# Patient Record
Sex: MALE | Race: WHITE | NOT HISPANIC OR LATINO | Employment: OTHER | URBAN - NONMETROPOLITAN AREA
[De-identification: names, ages, dates, MRNs, and addresses within clinical notes are randomized per-mention and may not be internally consistent; named-entity substitution may affect disease eponyms.]

---

## 2022-02-17 ENCOUNTER — OFFICE VISIT (OUTPATIENT)
Dept: CARDIOLOGY | Facility: CLINIC | Age: 37
End: 2022-02-17

## 2022-02-17 VITALS
TEMPERATURE: 97 F | WEIGHT: 186 LBS | DIASTOLIC BLOOD PRESSURE: 72 MMHG | SYSTOLIC BLOOD PRESSURE: 140 MMHG | HEART RATE: 82 BPM | HEIGHT: 72 IN | BODY MASS INDEX: 25.19 KG/M2

## 2022-02-17 DIAGNOSIS — R07.89 CHEST HEAVINESS: ICD-10-CM

## 2022-02-17 DIAGNOSIS — S09.90XA RECENT HEAD TRAUMA, INITIAL ENCOUNTER: ICD-10-CM

## 2022-02-17 DIAGNOSIS — R00.2 PALPITATIONS: ICD-10-CM

## 2022-02-17 DIAGNOSIS — R94.31 ABNORMAL EKG: ICD-10-CM

## 2022-02-17 DIAGNOSIS — R42 DIZZINESS: Primary | ICD-10-CM

## 2022-02-17 DIAGNOSIS — R61 DIAPHORESIS: ICD-10-CM

## 2022-02-17 DIAGNOSIS — R00.1 BRADYCARDIA, SINUS: ICD-10-CM

## 2022-02-17 DIAGNOSIS — F17.200 SMOKING: ICD-10-CM

## 2022-02-17 PROCEDURE — 93000 ELECTROCARDIOGRAM COMPLETE: CPT | Performed by: INTERNAL MEDICINE

## 2022-02-17 PROCEDURE — 99244 OFF/OP CNSLTJ NEW/EST MOD 40: CPT | Performed by: INTERNAL MEDICINE

## 2022-02-17 RX ORDER — MECLIZINE HYDROCHLORIDE 25 MG/1
25 TABLET ORAL 3 TIMES DAILY PRN
COMMUNITY
End: 2022-03-21

## 2022-02-17 RX ORDER — NICOTINE 10 MG
1 CARTRIDGE (EA) INHALATION AS NEEDED
Qty: 168 EACH | Refills: 6 | Status: SHIPPED | OUTPATIENT
Start: 2022-02-17 | End: 2022-03-21
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
## 2022-02-17 NOTE — PROGRESS NOTES
Yaya Bello  reports that he has been smoking cigarettes. He has a 30.00 pack-year smoking history. He has never used smokeless tobacco.. I have educated him on the risk of diseases from using tobacco products such as cancer, COPD and heart disease.     I advised him to quit and he is willing to quit. We have discussed the following method/s for tobacco cessation:  Education Material Counseling Prescription Medicaiton.  Together we have set a quit date for 1 week from today.  He will follow up with me in 3 months or sooner to check on his progress.    I spent 3  minutes counseling the patient.

## 2022-02-25 ENCOUNTER — TELEPHONE (OUTPATIENT)
Dept: CARDIOLOGY | Facility: CLINIC | Age: 37
End: 2022-02-25

## 2022-03-07 ENCOUNTER — TELEPHONE (OUTPATIENT)
Dept: NEUROLOGY | Facility: OTHER | Age: 37
End: 2022-03-07

## 2022-03-07 NOTE — TELEPHONE ENCOUNTER
Patients wife states that peggy left eye is now closed and he can't really open it up. I have placed him on high priority wait list in case we have any openings.

## 2022-03-15 ENCOUNTER — TELEPHONE (OUTPATIENT)
Dept: CARDIOLOGY | Facility: CLINIC | Age: 37
End: 2022-03-15

## 2022-03-15 DIAGNOSIS — R42 DIZZINESS: Primary | ICD-10-CM

## 2022-03-15 DIAGNOSIS — R00.1 BRADYCARDIA, SINUS: ICD-10-CM

## 2022-03-15 DIAGNOSIS — R07.89 CHEST HEAVINESS: ICD-10-CM

## 2022-03-15 DIAGNOSIS — R94.31 ABNORMAL EKG: ICD-10-CM

## 2022-03-15 NOTE — TELEPHONE ENCOUNTER
[facility] team contacted me, patient's nuclear stress test was denied by insurance.  They will cover treadmill stress if you want to order?

## 2022-03-21 ENCOUNTER — OFFICE VISIT (OUTPATIENT)
Dept: NEUROLOGY | Facility: CLINIC | Age: 37
End: 2022-03-21

## 2022-03-21 ENCOUNTER — LAB (OUTPATIENT)
Dept: LAB | Facility: HOSPITAL | Age: 37
End: 2022-03-21

## 2022-03-21 VITALS
HEIGHT: 72 IN | DIASTOLIC BLOOD PRESSURE: 86 MMHG | HEART RATE: 66 BPM | WEIGHT: 197.4 LBS | SYSTOLIC BLOOD PRESSURE: 142 MMHG | BODY MASS INDEX: 26.74 KG/M2 | TEMPERATURE: 97.3 F | OXYGEN SATURATION: 99 %

## 2022-03-21 DIAGNOSIS — R42 DIZZINESS: Primary | ICD-10-CM

## 2022-03-21 DIAGNOSIS — R42 DIZZINESS: ICD-10-CM

## 2022-03-21 PROBLEM — D18.00 HEMANGIOMA: Status: ACTIVE | Noted: 2022-03-21

## 2022-03-21 PROBLEM — F12.10 CANNABIS ABUSE: Status: ACTIVE | Noted: 2022-03-21

## 2022-03-21 PROBLEM — Z72.0 TOBACCO ABUSE: Status: ACTIVE | Noted: 2022-02-17

## 2022-03-21 PROBLEM — R11.0 NAUSEA: Status: ACTIVE | Noted: 2022-03-21

## 2022-03-21 PROBLEM — R10.11 RIGHT UPPER QUADRANT PAIN: Status: ACTIVE | Noted: 2022-03-21

## 2022-03-21 LAB
BASOPHILS # BLD AUTO: 0.04 10*3/MM3 (ref 0–0.2)
BASOPHILS NFR BLD AUTO: 0.5 % (ref 0–1.5)
CHROMATIN AB SERPL-ACNC: <10 IU/ML (ref 0–14)
CK SERPL-CCNC: 155 U/L (ref 20–200)
DEPRECATED RDW RBC AUTO: 38.7 FL (ref 37–54)
EOSINOPHIL # BLD AUTO: 0.18 10*3/MM3 (ref 0–0.4)
EOSINOPHIL NFR BLD AUTO: 2.3 % (ref 0.3–6.2)
ERYTHROCYTE [DISTWIDTH] IN BLOOD BY AUTOMATED COUNT: 12.1 % (ref 12.3–15.4)
ERYTHROCYTE [SEDIMENTATION RATE] IN BLOOD: 5 MM/HR (ref 0–15)
HCT VFR BLD AUTO: 49.9 % (ref 37.5–51)
HGB BLD-MCNC: 17.3 G/DL (ref 13–17.7)
IMM GRANULOCYTES # BLD AUTO: 0.02 10*3/MM3 (ref 0–0.05)
IMM GRANULOCYTES NFR BLD AUTO: 0.3 % (ref 0–0.5)
LYMPHOCYTES # BLD AUTO: 2.25 10*3/MM3 (ref 0.7–3.1)
LYMPHOCYTES NFR BLD AUTO: 29.4 % (ref 19.6–45.3)
MCH RBC QN AUTO: 30.7 PG (ref 26.6–33)
MCHC RBC AUTO-ENTMCNC: 34.7 G/DL (ref 31.5–35.7)
MCV RBC AUTO: 88.5 FL (ref 79–97)
MONOCYTES # BLD AUTO: 0.49 10*3/MM3 (ref 0.1–0.9)
MONOCYTES NFR BLD AUTO: 6.4 % (ref 5–12)
NEUTROPHILS NFR BLD AUTO: 4.68 10*3/MM3 (ref 1.7–7)
NEUTROPHILS NFR BLD AUTO: 61.1 % (ref 42.7–76)
NRBC BLD AUTO-RTO: 0 /100 WBC (ref 0–0.2)
PLATELET # BLD AUTO: 272 10*3/MM3 (ref 140–450)
PMV BLD AUTO: 10.3 FL (ref 6–12)
RBC # BLD AUTO: 5.64 10*6/MM3 (ref 4.14–5.8)
T4 SERPL-MCNC: 8.04 MCG/DL (ref 4.5–11.7)
VIT B12 BLD-MCNC: 442 PG/ML (ref 211–946)
WBC NRBC COR # BLD: 7.66 10*3/MM3 (ref 3.4–10.8)

## 2022-03-21 PROCEDURE — 84436 ASSAY OF TOTAL THYROXINE: CPT

## 2022-03-21 PROCEDURE — 82550 ASSAY OF CK (CPK): CPT

## 2022-03-21 PROCEDURE — 86431 RHEUMATOID FACTOR QUANT: CPT

## 2022-03-21 PROCEDURE — 85652 RBC SED RATE AUTOMATED: CPT

## 2022-03-21 PROCEDURE — 36415 COLL VENOUS BLD VENIPUNCTURE: CPT

## 2022-03-21 PROCEDURE — 99204 OFFICE O/P NEW MOD 45 MIN: CPT | Performed by: NURSE PRACTITIONER

## 2022-03-21 PROCEDURE — 82607 VITAMIN B-12: CPT

## 2022-03-21 PROCEDURE — 86038 ANTINUCLEAR ANTIBODIES: CPT

## 2022-03-21 PROCEDURE — 85025 COMPLETE CBC W/AUTO DIFF WBC: CPT
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
## 2022-03-22 LAB
ANA TITR SER IF: NEGATIVE {TITER}
LABORATORY COMMENT REPORT: NORMAL

## 2022-03-23 ENCOUNTER — TELEPHONE (OUTPATIENT)
Dept: NEUROLOGY | Facility: CLINIC | Age: 37
End: 2022-03-23

## 2022-03-23 NOTE — TELEPHONE ENCOUNTER
Relayed results to wife who states understanding.  Joe, asked if you had spoken to Dr. Rodney about his MRI yet?

## 2022-03-23 NOTE — TELEPHONE ENCOUNTER
----- Message from Addie Miller DNP, APRN sent at 3/22/2022  5:21 PM EDT -----  Please notify pat all labs were normal.

## 2022-03-24 NOTE — TELEPHONE ENCOUNTER
Spoke with Genevieve and relayed results.  Wife asked if there would be any further testing and we relayed that we would let her know.

## 2022-03-30 ENCOUNTER — HOSPITAL ENCOUNTER (OUTPATIENT)
Dept: CARDIOLOGY | Facility: HOSPITAL | Age: 37
Discharge: HOME | End: 2022-03-30

## 2022-03-30 ENCOUNTER — APPOINTMENT (OUTPATIENT)
Dept: CARDIOLOGY | Facility: HOSPITAL | Age: 37
End: 2022-03-30

## 2022-03-30 ENCOUNTER — LAB (OUTPATIENT)
Dept: LAB | Facility: HOSPITAL | Age: 37
End: 2022-03-30

## 2022-03-30 DIAGNOSIS — R07.89 CHEST HEAVINESS: ICD-10-CM

## 2022-03-30 DIAGNOSIS — R61 DIAPHORESIS: ICD-10-CM

## 2022-03-30 DIAGNOSIS — R00.1 BRADYCARDIA, SINUS: ICD-10-CM

## 2022-03-30 DIAGNOSIS — R94.31 ABNORMAL EKG: ICD-10-CM

## 2022-03-30 DIAGNOSIS — R00.2 PALPITATIONS: ICD-10-CM

## 2022-03-30 DIAGNOSIS — R42 DIZZINESS: ICD-10-CM

## 2022-03-30 LAB
ALBUMIN SERPL-MCNC: 4.75 G/DL (ref 3.5–5.2)
ALBUMIN/GLOB SERPL: 1.8 G/DL
ALP SERPL-CCNC: 89 U/L (ref 39–117)
ALT SERPL W P-5'-P-CCNC: 9 U/L (ref 1–41)
ANION GAP SERPL CALCULATED.3IONS-SCNC: 12.3 MMOL/L (ref 5–15)
AST SERPL-CCNC: 13 U/L (ref 1–40)
BASOPHILS # BLD AUTO: 0.03 10*3/MM3 (ref 0–0.2)
BASOPHILS NFR BLD AUTO: 0.3 % (ref 0–1.5)
BH CV ECHO MEAS - ACS: 2.07 CM
BH CV ECHO MEAS - AO MAX PG: 6.7 MMHG
BH CV ECHO MEAS - AO MEAN PG: 4.2 MMHG
BH CV ECHO MEAS - AO ROOT DIAM: 3.2 CM
BH CV ECHO MEAS - AO V2 MAX: 129 CM/SEC
BH CV ECHO MEAS - AO V2 VTI: 26.5 CM
BH CV ECHO MEAS - EDV(CUBED): 63.5 ML
BH CV ECHO MEAS - EDV(MOD-SP4): 71.9 ML
BH CV ECHO MEAS - EF(MOD-SP4): 57.3 %
BH CV ECHO MEAS - EF_3D-VOL: 58 %
BH CV ECHO MEAS - ESV(CUBED): 21.5 ML
BH CV ECHO MEAS - ESV(MOD-SP4): 30.7 ML
BH CV ECHO MEAS - FS: 30.3 %
BH CV ECHO MEAS - IVS/LVPW: 1.24 CM
BH CV ECHO MEAS - IVSD: 1.49 CM
BH CV ECHO MEAS - LA DIMENSION: 3.7 CM
BH CV ECHO MEAS - LAT PEAK E' VEL: 17.5 CM/SEC
BH CV ECHO MEAS - LV DIASTOLIC VOL/BSA (35-75): 34 CM2
BH CV ECHO MEAS - LV MASS(C)D: 195.8 GRAMS
BH CV ECHO MEAS - LV SYSTOLIC VOL/BSA (12-30): 14.5 CM2
BH CV ECHO MEAS - LVIDD: 4 CM
BH CV ECHO MEAS - LVIDS: 2.8 CM
BH CV ECHO MEAS - LVOT AREA: 3.1 CM2
BH CV ECHO MEAS - LVOT DIAM: 1.97 CM
BH CV ECHO MEAS - LVPWD: 1.2 CM
BH CV ECHO MEAS - MED PEAK E' VEL: 10.3 CM/SEC
BH CV ECHO MEAS - MV A MAX VEL: 51.4 CM/SEC
BH CV ECHO MEAS - MV DEC SLOPE: 392.3 CM/SEC2
BH CV ECHO MEAS - MV E MAX VEL: 82.3 CM/SEC
BH CV ECHO MEAS - MV E/A: 1.6
BH CV ECHO MEAS - RVDD: 3 CM
BH CV ECHO MEAS - SI(MOD-SP4): 19.5 ML/M2
BH CV ECHO MEAS - SV(MOD-SP4): 41.2 ML
BH CV ECHO MEASUREMENTS AVERAGE E/E' RATIO: 5.92
BH CV STRESS RECOVERY BP: NORMAL MMHG
BH CV STRESS RECOVERY HR: 96 BPM
BILIRUB SERPL-MCNC: 0.7 MG/DL (ref 0–1.2)
BUN SERPL-MCNC: 10 MG/DL (ref 6–20)
BUN/CREAT SERPL: 9.3 (ref 7–25)
CALCIUM SPEC-SCNC: 10.3 MG/DL (ref 8.6–10.5)
CHLORIDE SERPL-SCNC: 102 MMOL/L (ref 98–107)
CHOLEST SERPL-MCNC: 203 MG/DL (ref 0–200)
CO2 SERPL-SCNC: 25.7 MMOL/L (ref 22–29)
CREAT SERPL-MCNC: 1.07 MG/DL (ref 0.76–1.27)
DEPRECATED RDW RBC AUTO: 44.1 FL (ref 37–54)
EGFRCR SERPLBLD CKD-EPI 2021: 91.7 ML/MIN/1.73
EOSINOPHIL # BLD AUTO: 0.17 10*3/MM3 (ref 0–0.4)
EOSINOPHIL NFR BLD AUTO: 1.8 % (ref 0.3–6.2)
ERYTHROCYTE [DISTWIDTH] IN BLOOD BY AUTOMATED COUNT: 12.8 % (ref 12.3–15.4)
GLOBULIN UR ELPH-MCNC: 2.7 GM/DL
GLUCOSE SERPL-MCNC: 101 MG/DL (ref 65–99)
HCT VFR BLD AUTO: 52.6 % (ref 37.5–51)
HDLC SERPL-MCNC: 58 MG/DL (ref 40–60)
HGB BLD-MCNC: 17.3 G/DL (ref 13–17.7)
IMM GRANULOCYTES # BLD AUTO: 0.04 10*3/MM3 (ref 0–0.05)
IMM GRANULOCYTES NFR BLD AUTO: 0.4 % (ref 0–0.5)
LDLC SERPL CALC-MCNC: 136 MG/DL (ref 0–100)
LDLC/HDLC SERPL: 2.32 {RATIO}
LEFT ATRIUM VOLUME INDEX: 25.2 ML/M2
LYMPHOCYTES # BLD AUTO: 1.99 10*3/MM3 (ref 0.7–3.1)
LYMPHOCYTES NFR BLD AUTO: 20.6 % (ref 19.6–45.3)
MAXIMAL PREDICTED HEART RATE: 183 BPM
MAXIMAL PREDICTED HEART RATE: 183 BPM
MCH RBC QN AUTO: 30.6 PG (ref 26.6–33)
MCHC RBC AUTO-ENTMCNC: 32.9 G/DL (ref 31.5–35.7)
MCV RBC AUTO: 93.1 FL (ref 79–97)
MONOCYTES # BLD AUTO: 0.48 10*3/MM3 (ref 0.1–0.9)
MONOCYTES NFR BLD AUTO: 5 % (ref 5–12)
NEUTROPHILS NFR BLD AUTO: 6.93 10*3/MM3 (ref 1.7–7)
NEUTROPHILS NFR BLD AUTO: 71.9 % (ref 42.7–76)
NRBC BLD AUTO-RTO: 0 /100 WBC (ref 0–0.2)
PERCENT MAX PREDICTED HR: 87.43 %
PLATELET # BLD AUTO: 263 10*3/MM3 (ref 140–450)
PMV BLD AUTO: 10.5 FL (ref 6–12)
POTASSIUM SERPL-SCNC: 4.7 MMOL/L (ref 3.5–5.2)
PROT SERPL-MCNC: 7.4 G/DL (ref 6–8.5)
RBC # BLD AUTO: 5.65 10*6/MM3 (ref 4.14–5.8)
SODIUM SERPL-SCNC: 140 MMOL/L (ref 136–145)
STRESS BASELINE BP: NORMAL MMHG
STRESS BASELINE HR: 67 BPM
STRESS PERCENT HR: 103 %
STRESS POST ESTIMATED WORKLOAD: 13.4 METS
STRESS POST EXERCISE DUR MIN: 10 MIN
STRESS POST PEAK BP: NORMAL MMHG
STRESS POST PEAK HR: 160 BPM
STRESS TARGET HR: 156 BPM
STRESS TARGET HR: 156 BPM
TRIGL SERPL-MCNC: 51 MG/DL (ref 0–150)
TSH SERPL DL<=0.05 MIU/L-ACNC: 1.09 UIU/ML (ref 0.27–4.2)
VLDLC SERPL-MCNC: 9 MG/DL (ref 5–40)
WBC NRBC COR # BLD: 9.64 10*3/MM3 (ref 3.4–10.8)

## 2022-03-30 PROCEDURE — 80061 LIPID PANEL: CPT

## 2022-03-30 PROCEDURE — 86141 C-REACTIVE PROTEIN HS: CPT

## 2022-03-30 PROCEDURE — 93306 TTE W/DOPPLER COMPLETE: CPT | Performed by: INTERNAL MEDICINE

## 2022-03-30 PROCEDURE — 85025 COMPLETE CBC W/AUTO DIFF WBC: CPT

## 2022-03-30 PROCEDURE — 84443 ASSAY THYROID STIM HORMONE: CPT

## 2022-03-30 PROCEDURE — 80053 COMPREHEN METABOLIC PANEL: CPT

## 2022-03-30 PROCEDURE — 93017 CV STRESS TEST TRACING ONLY: CPT

## 2022-03-30 PROCEDURE — 36415 COLL VENOUS BLD VENIPUNCTURE: CPT

## 2022-03-30 PROCEDURE — 93306 TTE W/DOPPLER COMPLETE: CPT

## 2022-03-30 PROCEDURE — 93018 CV STRESS TEST I&R ONLY: CPT | Performed by: INTERNAL MEDICINE

## 2022-03-31 LAB — CRP SERPL-MCNC: 0.16 MG/DL (ref 0.01–0.5)

## 2022-04-01 RX ORDER — ROSUVASTATIN CALCIUM 10 MG/1
10 TABLET, COATED ORAL DAILY
Qty: 90 TABLET | Refills: 3 | Status: SHIPPED | OUTPATIENT
Start: 2022-04-01 | End: 2022-05-23 | Stop reason: SDUPTHER

## 2022-04-01 RX ORDER — METOPROLOL SUCCINATE 25 MG/1
25 TABLET, EXTENDED RELEASE ORAL DAILY
Qty: 90 TABLET | Refills: 3 | Status: SHIPPED | OUTPATIENT
Start: 2022-04-01 | End: 2022-05-23 | Stop reason: SDUPTHER

## 2022-04-01 NOTE — PROGRESS NOTES
Pt's wife aware of all lab results and recommendations to add Crestor 10 mg daily. She would like script sent to the [facility].

## 2022-04-01 NOTE — TELEPHONE ENCOUNTER
Pt's wife aware of stress results and recommendations to add Toprol XL 25 mg once a day. She would like script sent to [facility].     Pt's wife also aware of lab results and recommendations to add Crestor 10 mg daily. Sending that script to [facility] also.

## 2022-05-23 ENCOUNTER — OFFICE VISIT (OUTPATIENT)
Dept: CARDIOLOGY | Facility: CLINIC | Age: 37
End: 2022-05-23

## 2022-05-23 VITALS
HEIGHT: 72 IN | WEIGHT: 200 LBS | HEART RATE: 68 BPM | DIASTOLIC BLOOD PRESSURE: 60 MMHG | SYSTOLIC BLOOD PRESSURE: 148 MMHG | BODY MASS INDEX: 27.09 KG/M2

## 2022-05-23 DIAGNOSIS — Z72.0 TOBACCO ABUSE: ICD-10-CM

## 2022-05-23 DIAGNOSIS — R42 DIZZINESS: Primary | ICD-10-CM

## 2022-05-23 DIAGNOSIS — I10 PRIMARY HYPERTENSION: ICD-10-CM

## 2022-05-23 DIAGNOSIS — R00.2 PALPITATIONS: ICD-10-CM

## 2022-05-23 DIAGNOSIS — E88.81 METABOLIC SYNDROME: ICD-10-CM

## 2022-05-23 DIAGNOSIS — E78.00 HYPERCHOLESTEREMIA: ICD-10-CM

## 2022-05-23 PROBLEM — E88.810 METABOLIC SYNDROME: Status: ACTIVE | Noted: 2022-05-23

## 2022-05-23 PROCEDURE — 99214 OFFICE O/P EST MOD 30 MIN: CPT | Performed by: INTERNAL MEDICINE

## 2022-05-23 RX ORDER — ROSUVASTATIN CALCIUM 10 MG/1
10 TABLET, COATED ORAL DAILY
Qty: 90 TABLET | Refills: 3 | Status: SHIPPED | OUTPATIENT
Start: 2022-05-23

## 2022-05-23 RX ORDER — METOPROLOL SUCCINATE 25 MG/1
25 TABLET, EXTENDED RELEASE ORAL DAILY
Qty: 90 TABLET | Refills: 3 | Status: SHIPPED | OUTPATIENT
Start: 2022-05-23

## 2022-05-23 NOTE — PROGRESS NOTES
Chief Complaint   Patient presents with   • Follow-up     For cardiac management, doing much better, last episode of dizziness was in March, does still have some random chest pain   • Med Refill     Refills needed on cardiac meds, 90 days to [facility]   • labs     Most recent labs in chart.        CARDIAC COMPLAINTS  Dizziness        Subjective   Yaya Bello is a 36 y.o. male came in today for his follow-up visit.  He was referred to me for episodes of dizziness which got worse after an accident where a large tree limb fell on his head.  He also has been noticing some chest tightness and shortness of breath.  He underwent lab work which showed his cholesterol was elevated at 203 with the LDL of 136.  His connective tissue work-up was normal.  His blood count was normal and so was his chemistry.  His echocardiogram showed normal LV function.  His regular stress test showed mild increase in blood pressure response.  Low-dose of beta-blockers and statin were added.  He came today with no new symptoms.  The last episode of dizziness happened in March.  Still gets some occasional chest pain but nothing as before.  He is able to do more activities than before.  He also has been cutting down on the number of cigarettes he smokes.              Cardiac History  Past Surgical History:   Procedure Laterality Date   • CARDIOVASCULAR STRESS TEST  03/30/2022    R.Stress- 10 Min. 13.4 METS. 87% THR. BP- 206/72. Negative   • ECHO - CONVERTED  03/30/2022    EF 65%. Trace-Mild MR   • OTHER SURGICAL HISTORY  01/11/2022    CT Head- 8 mm Lytic lesion in (R) parietal bone. R/O Calvarial Hemangioma   • OTHER SURGICAL HISTORY  03/31/2022    Ext Mon 19 Days. Avg 76. . Rare PAC & PVC   • SHOULDER ARTHROSCOPY         Current Outpatient Medications   Medication Sig Dispense Refill   • metoprolol succinate XL (TOPROL-XL) 25 MG 24 hr tablet Take 1 tablet by mouth Daily. 90 tablet 3   • rosuvastatin (CRESTOR) 10 MG tablet Take 1  tablet by mouth Daily. 90 tablet 3     No current facility-administered medications for this visit.       Allergies  :  Patient has no known allergies.       Past Medical History:   Diagnosis Date   • Dizziness    • Gall stone    • Tobacco abuse        Social History     Socioeconomic History   • Marital status:    Tobacco Use   • Smoking status: Current Every Day Smoker     Packs/day: 1.50     Years: 20.00     Pack years: 30.00     Types: Cigarettes   • Smokeless tobacco: Never Used   • Tobacco comment: Since 2003   Vaping Use   • Vaping Use: Never used   Substance and Sexual Activity   • Alcohol use: Yes     Comment: rarely, twice a year   • Drug use: Never       Family History   Problem Relation Age of Onset   • Other Mother         medical history unknown   • Other Father         unknown   • No Known Problems Sister    • Stroke Maternal Grandmother    • Valvular heart disease Maternal Grandmother    • Hypertension Maternal Grandmother    • Heart disease Maternal Grandmother    • Diabetes Maternal Grandmother    • Arthritis Maternal Grandmother    • Arthritis Maternal Grandfather    • Diabetes Maternal Grandfather    • Hypertension Maternal Grandfather    • Stroke Maternal Grandfather    • Parkinsonism Maternal Grandfather    • Heart attack Maternal Grandfather         Multiple MI's, first at 45 years old   • Heart disease Maternal Grandfather         s/p stenting   • No Known Problems Daughter    • No Known Problems Daughter        Review of Systems   Constitutional: Negative for decreased appetite and malaise/fatigue.   HENT: Negative for congestion and sore throat.    Eyes: Negative for blurred vision.   Cardiovascular: Positive for chest pain. Negative for palpitations.   Respiratory: Negative for shortness of breath and snoring.    Endocrine: Negative for cold intolerance and heat intolerance.   Hematologic/Lymphatic: Negative for adenopathy. Does not bruise/bleed easily.   Skin: Negative for itching,  "nail changes and skin cancer.   Musculoskeletal: Negative for arthritis and myalgias.   Gastrointestinal: Negative for abdominal pain, dysphagia and heartburn.   Genitourinary: Negative for bladder incontinence and frequency.   Neurological: Positive for dizziness. Negative for light-headedness, seizures and vertigo.   Psychiatric/Behavioral: Negative for altered mental status.   Allergic/Immunologic: Negative for environmental allergies and hives.       Diabetes- No  Thyroid- normal    Objective     /60   Pulse 68   Ht 182.9 cm (72\")   Wt 90.7 kg (200 lb)   BMI 27.12 kg/m²     Vitals and nursing note reviewed.   Constitutional:       Appearance: Healthy appearance. Not in distress.   Eyes:      Conjunctiva/sclera: Conjunctivae normal.      Pupils: Pupils are equal, round, and reactive to light.   HENT:      Head: Normocephalic.   Pulmonary:      Effort: Pulmonary effort is normal.      Breath sounds: Normal breath sounds.   Cardiovascular:      PMI at left midclavicular line. Normal rate. Regular rhythm.      Murmurs: There is a systolic murmur.   Abdominal:      General: Bowel sounds are normal.      Palpations: Abdomen is soft.   Musculoskeletal: Normal range of motion.      Cervical back: Normal range of motion and neck supple. Skin:     General: Skin is warm and dry.   Neurological:      Mental Status: Alert, oriented to person, place, and time and oriented to person, place and time.       Procedures            @ASSESSMENT/PLAN@  BMI is >= 25 and < 30. (Overweight) The following options were offered after discussion: exercise counseling/recommendations and nutrition counseling/recommendations     Diagnoses and all orders for this visit:    1. Dizziness (Primary)    2. Palpitations  -     metoprolol succinate XL (TOPROL-XL) 25 MG 24 hr tablet; Take 1 tablet by mouth Daily.  Dispense: 90 tablet; Refill: 3    3. Tobacco abuse    4. Primary hypertension  -     metoprolol succinate XL (TOPROL-XL) 25 MG 24 " hr tablet; Take 1 tablet by mouth Daily.  Dispense: 90 tablet; Refill: 3    5. Metabolic syndrome    6. Hypercholesteremia  -     rosuvastatin (CRESTOR) 10 MG tablet; Take 1 tablet by mouth Daily.  Dispense: 90 tablet; Refill: 3       At baseline his heart rate is stable.  His blood pressure is normal.  He is BMI is around 27.  His cardiovascular examination is within normal limit.    Regarding the dizziness, it seems to have subsided after being on the low-dose of beta-blockers.  At this time continue the same    Regarding the palpitation, it also has come down with the beta-blocker so continue the same.  Encouraged him to increase his fluid intake    Regarding his tobacco use, he has been cutting down the number of cigarettes.  I talked to him about it and the increased risk of developing problems in the future    Regarding his hypertension which seems to be stress-induced, it seems to be controlled with Toprol so continue the same    Regarding his hypercholesterolemia, he is on Crestor.  Need to recheck it along with the LFT in 4 to 6 months    Regarding the metabolic syndrome, talked to him again about the diet especially low-carb diet    Overall cardiac status appears stable.  I will see him back in 6 months or sooner if needed.                  Electronically signed by Jun Desir MD May 23, 2022 15:05 EDT

## 2022-08-29 ENCOUNTER — TELEPHONE (OUTPATIENT)
Dept: CARDIOLOGY | Facility: CLINIC | Age: 37
End: 2022-08-29

## 2022-08-29 NOTE — TELEPHONE ENCOUNTER
Received fax from Dr. Rivero for cardiac clearance for patient to have a laparoscopic cholecystectomy. According to our records, I do not see where patient is on any blood thinners or has had any stenting.       Fax [phone]

## 2022-11-14 ENCOUNTER — TELEPHONE (OUTPATIENT)
Dept: NEUROLOGY | Facility: CLINIC | Age: 37
End: 2022-11-14

## 2022-11-14 NOTE — TELEPHONE ENCOUNTER
Called to check on patient. Wife states he is doing much better with no dizziness on blood pressure meds. Discussed abnormal CT. Lesion felt to be a benign hemangioma but recommended repeat CT of head to confirm stability. She will check with her  and let me know.

## 2022-11-22 ENCOUNTER — TELEPHONE (OUTPATIENT)
Dept: NEUROLOGY | Facility: CLINIC | Age: 37
End: 2022-11-22

## 2022-12-01 ENCOUNTER — TELEPHONE (OUTPATIENT)
Dept: NEUROLOGY | Facility: CLINIC | Age: 37
End: 2022-12-01

## 2022-12-20 ENCOUNTER — TELEPHONE (OUTPATIENT)
Dept: NEUROLOGY | Facility: CLINIC | Age: 37
End: 2022-12-20

## 2022-12-20 ENCOUNTER — DOCUMENTATION (OUTPATIENT)
Dept: NEUROLOGY | Facility: CLINIC | Age: 37
End: 2022-12-20

## 2023-05-30 ENCOUNTER — TELEPHONE (OUTPATIENT)
Dept: CARDIOLOGY | Facility: CLINIC | Age: 38
End: 2023-05-30

## 2023-05-30 DIAGNOSIS — E78.00 HYPERCHOLESTEREMIA: ICD-10-CM

## 2023-05-30 DIAGNOSIS — R00.2 PALPITATIONS: ICD-10-CM

## 2023-05-30 DIAGNOSIS — I10 PRIMARY HYPERTENSION: ICD-10-CM

## 2023-05-30 RX ORDER — METOPROLOL SUCCINATE 25 MG/1
25 TABLET, EXTENDED RELEASE ORAL DAILY
Qty: 90 TABLET | Refills: 3 | Status: CANCELLED | OUTPATIENT
Start: 2023-05-30

## 2023-05-30 RX ORDER — ROSUVASTATIN CALCIUM 10 MG/1
10 TABLET, COATED ORAL DAILY
Qty: 90 TABLET | Refills: 3 | Status: CANCELLED | OUTPATIENT
Start: 2023-05-30

## 2023-05-30 NOTE — TELEPHONE ENCOUNTER
Caller: ILIANA BURNETTE    Relationship to patient: Emergency Contact    Best call back number: [phone]    Patient is needing: TO SCHEDULE AN APPT TO BE SEEN. PT HAS NOT BEEN SEEN IN AWHILE. THANK YOU

## 2023-05-30 NOTE — TELEPHONE ENCOUNTER
Caller: ILIANA BURNETTE    Relationship: Emergency Contact    Best call back number: [phone]    Requested Prescriptions:   Requested Prescriptions     Pending Prescriptions Disp Refills   • metoprolol succinate XL (TOPROL-XL) 25 MG 24 hr tablet 90 tablet 3     Sig: Take 1 tablet by mouth Daily.   • rosuvastatin (CRESTOR) 10 MG tablet 90 tablet 3     Sig: Take 1 tablet by mouth Daily.        Pharmacy where request should be sent: [facility] [phone] [place] [phone] [facility]     Last office visit with prescribing clinician: 5/23/2022   Last telemedicine visit with prescribing clinician: Visit date not found   Next office visit with prescribing clinician: Visit date not found     Additional details provided by patient: PT HAS ENOUGH MEDICATION TO LAST UNTIL Friday.     Does the patient have less than a 3 day supply:  [] Yes  [x] No    Would you like a call back once the refill request has been completed: [x] Yes [] No    If the office needs to give you a call back, can they leave a voicemail: [x] Yes [] No    Kamille Mcghee Rep   05/30/23 09:04 EDT

## 2023-05-31 ENCOUNTER — OFFICE VISIT (OUTPATIENT)
Dept: CARDIOLOGY | Facility: CLINIC | Age: 38
End: 2023-05-31
Payer: COMMERCIAL

## 2023-05-31 VITALS
SYSTOLIC BLOOD PRESSURE: 122 MMHG | DIASTOLIC BLOOD PRESSURE: 70 MMHG | HEIGHT: 72 IN | WEIGHT: 200.4 LBS | HEART RATE: 60 BPM | BODY MASS INDEX: 27.14 KG/M2

## 2023-05-31 DIAGNOSIS — R00.2 PALPITATIONS: ICD-10-CM

## 2023-05-31 DIAGNOSIS — Z79.899 MEDICATION MANAGEMENT: Primary | ICD-10-CM

## 2023-05-31 DIAGNOSIS — E78.00 HYPERCHOLESTEREMIA: ICD-10-CM

## 2023-05-31 DIAGNOSIS — I10 PRIMARY HYPERTENSION: ICD-10-CM

## 2023-05-31 RX ORDER — METOPROLOL SUCCINATE 25 MG/1
25 TABLET, EXTENDED RELEASE ORAL DAILY
Qty: 90 TABLET | Refills: 4 | Status: SHIPPED | OUTPATIENT
Start: 2023-05-31

## 2023-05-31 RX ORDER — ROSUVASTATIN CALCIUM 10 MG/1
10 TABLET, COATED ORAL DAILY
Qty: 90 TABLET | Refills: 4 | Status: SHIPPED | OUTPATIENT
Start: 2023-05-31

## 2023-05-31 NOTE — LETTER
"June 5, 2023       No Recipients    Patient: Yaya Bello   YOB: 1985   Date of Visit: 5/31/2023       Dear Rancho Lee MD    Yaya Bello was in my office today. Below is a copy of my note.    If you have questions, please do not hesitate to call me. I look forward to following Yaya along with you.         Sincerely,        FLOR Farr        CC:   No Recipients    Chief Complaint   Patient presents with   • Follow-up     Cardiac management   • Lab     No current labs. Last labs in chart.   • Dizziness     Has noticed for the last few weeks. States \"it feels just a touch off the normal\", head feels swimmy. Unable to determine if elevated B/P or if sinus related.   • Fatigue     Feeling more tired than normal.   • Med Refill     Needs refills on cardiac medications-90 day.     Subjective       Yaya Bello is a 37 y.o. male referred in 2022 secondary to dizziness. Prior to visit, he suffered head injury when a large tree limb fell on his head requiring 11 staples. Apparently no CT done at that time. Initially, he felt well then a few days later became diaphoretic, nauseated and dizzy. He returned to ER, CT scan showed small lytic focus in the right parietal bone felt to be calvarial hemangioma. Also noted to have bradycardia and was advised to see cardiology.     Cardiac work up showed no significant bradycardia or arrhythmia, stress and echo showed good exercise tolerance, no ischemia by EKG criteria, hypertensive blood pressure response to exercise and normal LVEF and valves. Toprol XL 25 mg daily added. Labs showed normal CBC and CMP. , Tri 51, HDL 58, . Crestor 10 mg added. Smoking cessation encouraged.     He returns today for follow up. Dizziness has improved. He continues to feel \"a little swimmy-headed\" at times. He denies CP or dyspnea. He remains physically active without difficulty. No repeat labs since starting Crestor.          Cardiac History:    Past Surgical " History:   Procedure Laterality Date   • CARDIOVASCULAR STRESS TEST  03/30/2022    R.Stress- 10 Min. 13.4 METS. 87% THR. BP- 206/72. Negative   • ECHO - CONVERTED  03/30/2022    EF 65%. Trace-Mild MR   • OTHER SURGICAL HISTORY  01/11/2022    CT Head- 8 mm Lytic lesion in (R) parietal bone. R/O Calvarial Hemangioma   • OTHER SURGICAL HISTORY  03/31/2022    Ext Mon 19 Days. Avg 76. . Rare PAC & PVC   • SHOULDER ARTHROSCOPY       Current Outpatient Medications   Medication Sig Dispense Refill   • Ascorbic Acid (VITAMIN C PO) Take  by mouth Daily.     • Cyanocobalamin (VITAMIN B 12 PO) Take  by mouth Daily.     • metoprolol succinate XL (TOPROL-XL) 25 MG 24 hr tablet Take 1 tablet by mouth Daily. 90 tablet 4   • Multiple Vitamins-Minerals (ZINC PO) Take  by mouth Daily.     • rosuvastatin (CRESTOR) 10 MG tablet Take 1 tablet by mouth Daily. 90 tablet 4   • VITAMIN D PO Take  by mouth Daily.       No current facility-administered medications for this visit.     Patient has no known allergies.    Past Medical History:   Diagnosis Date   • Dizziness    • Gall stone    • Hx of cholecystectomy    • Tobacco abuse      Social History     Socioeconomic History   • Marital status:    Tobacco Use   • Smoking status: Every Day     Packs/day: 1.50     Types: Cigarettes     Start date: 2002   • Smokeless tobacco: Never   • Tobacco comments:     Since 2003   Vaping Use   • Vaping Use: Never used   Substance and Sexual Activity   • Alcohol use: Yes     Comment: rarely, twice a year   • Drug use: Never   • Sexual activity: Defer     Family History   Problem Relation Age of Onset   • Other Mother         medical history unknown   • Other Father         unknown   • No Known Problems Sister    • Stroke Maternal Grandmother    • Valvular heart disease Maternal Grandmother    • Hypertension Maternal Grandmother    • Heart disease Maternal Grandmother    • Diabetes Maternal Grandmother    • Arthritis Maternal Grandmother    •  "Arthritis Maternal Grandfather    • Diabetes Maternal Grandfather    • Hypertension Maternal Grandfather    • Stroke Maternal Grandfather    • Parkinsonism Maternal Grandfather    • Heart attack Maternal Grandfather         Multiple MI's, first at 45 years old   • Heart disease Maternal Grandfather         s/p stenting   • No Known Problems Daughter    • No Known Problems Daughter      Review of Systems   Constitutional: Positive for malaise/fatigue. Negative for decreased appetite.   HENT: Negative.     Eyes:  Negative for blurred vision.   Cardiovascular:  Negative for chest pain, dyspnea on exertion, leg swelling, palpitations and syncope.   Respiratory:  Negative for shortness of breath and sleep disturbances due to breathing.    Endocrine: Negative.    Hematologic/Lymphatic: Negative for bleeding problem. Does not bruise/bleed easily.   Skin: Negative.    Musculoskeletal:  Negative for falls and myalgias.   Gastrointestinal:  Negative for abdominal pain, heartburn and melena.   Genitourinary:  Negative for hematuria.   Neurological:  Positive for dizziness (improved). Negative for light-headedness and seizures.   Psychiatric/Behavioral:  Negative for altered mental status.    Allergic/Immunologic: Negative.       Objective     /70 (BP Location: Right arm)   Pulse 60   Ht 182.9 cm (72.01\")   Wt 90.9 kg (200 lb 6.4 oz)   BMI 27.17 kg/m²     Vitals and nursing note reviewed.   Constitutional:       General: Not in acute distress.     Appearance: Well-developed. Not diaphoretic.   Eyes:      Pupils: Pupils are equal, round, and reactive to light.   HENT:      Head: Normocephalic.   Pulmonary:      Effort: Pulmonary effort is normal. No respiratory distress.      Breath sounds: Normal breath sounds.   Cardiovascular:      Normal rate. Regular rhythm.   Pulses:     Intact distal pulses.   Edema:     Peripheral edema absent.   Abdominal:      General: Bowel sounds are normal.      Palpations: Abdomen is " soft.   Musculoskeletal: Normal range of motion.      Cervical back: Normal range of motion. Skin:     General: Skin is warm and dry.   Neurological:      Mental Status: Alert and oriented to person, place, and time.        Procedures          Problem List Items Addressed This Visit          Cardiac and Vasculature    Palpitations    Relevant Medications    metoprolol succinate XL (TOPROL-XL) 25 MG 24 hr tablet    Other Relevant Orders    CBC & Differential    Comprehensive Metabolic Panel    Lipid Panel    TSH    Magnesium    Primary hypertension    Relevant Medications    metoprolol succinate XL (TOPROL-XL) 25 MG 24 hr tablet    Other Relevant Orders    CBC & Differential    Comprehensive Metabolic Panel    Lipid Panel    TSH    Magnesium    Hypercholesteremia    Relevant Medications    rosuvastatin (CRESTOR) 10 MG tablet    Other Relevant Orders    CBC & Differential    Comprehensive Metabolic Panel    Lipid Panel    TSH    Magnesium     Other Visit Diagnoses       Medication management    -  Primary    Relevant Orders    CBC & Differential    Comprehensive Metabolic Panel    Lipid Panel    TSH    Magnesium           HTN- well controlled. Reviewed stress test with him. Continue Toprol XL. Limit Na. Weight loss.     2.   Hyperlipidemia- LDL elevated at 136, Crestor 10 mg added. Advised to repeat labs to check lipids, LFT.     3.   Dizziness- improved, MRI per pt remained stable.       4.   Smoking cessation encouraged.     Follow up in one year or sooner if needed.     BMI is >= 25 and <30. (Overweight) The following options were offered after discussion;: nutrition counseling/recommendations     Yaya Bello  reports that he has been smoking cigarettes. He started smoking about 21 years ago. He has been smoking an average of 1.5 packs per day. He has never used smokeless tobacco.. I have educated him on the risk of diseases from using tobacco products such as cancer, COPD, and heart disease.              Electronically signed by FLRO aFrr,  June 5, 2023 08:06 EDT

## 2023-05-31 NOTE — PROGRESS NOTES
"Chief Complaint   Patient presents with    Follow-up     Cardiac management    Lab     No current labs. Last labs in chart.    Dizziness     Has noticed for the last few weeks. States \"it feels just a touch off the normal\", head feels swimmy. Unable to determine if elevated B/P or if sinus related.    Fatigue     Feeling more tired than normal.    Med Refill     Needs refills on cardiac medications-90 day.     Subjective       Yaya Bello is a 37 y.o. male referred in 2022 secondary to dizziness. Prior to visit, he suffered head injury when a large tree limb fell on his head requiring 11 staples. Apparently no CT done at that time. Initially, he felt well then a few days later became diaphoretic, nauseated and dizzy. He returned to ER, CT scan showed small lytic focus in the right parietal bone felt to be calvarial hemangioma. Also noted to have bradycardia and was advised to see cardiology.     Cardiac work up showed no significant bradycardia or arrhythmia, stress and echo showed good exercise tolerance, no ischemia by EKG criteria, hypertensive blood pressure response to exercise and normal LVEF and valves. Toprol XL 25 mg daily added. Labs showed normal CBC and CMP. , Tri 51, HDL 58, . Crestor 10 mg added. Smoking cessation encouraged.     He returns today for follow up. Dizziness has improved. He continues to feel \"a little swimmy-headed\" at times. He denies CP or dyspnea. He remains physically active without difficulty. No repeat labs since starting Crestor.          Cardiac History:    Past Surgical History:   Procedure Laterality Date    CARDIOVASCULAR STRESS TEST  03/30/2022    R.Stress- 10 Min. 13.4 METS. 87% THR. BP- 206/72. Negative    ECHO - CONVERTED  03/30/2022    EF 65%. Trace-Mild MR    OTHER SURGICAL HISTORY  01/11/2022    CT Head- 8 mm Lytic lesion in (R) parietal bone. R/O Calvarial Hemangioma    OTHER SURGICAL HISTORY  03/31/2022    Ext Mon 19 Days. Avg 76. . Rare PAC & PVC    " SHOULDER ARTHROSCOPY       Current Outpatient Medications   Medication Sig Dispense Refill    Ascorbic Acid (VITAMIN C PO) Take  by mouth Daily.      Cyanocobalamin (VITAMIN B 12 PO) Take  by mouth Daily.      metoprolol succinate XL (TOPROL-XL) 25 MG 24 hr tablet Take 1 tablet by mouth Daily. 90 tablet 4    Multiple Vitamins-Minerals (ZINC PO) Take  by mouth Daily.      rosuvastatin (CRESTOR) 10 MG tablet Take 1 tablet by mouth Daily. 90 tablet 4    VITAMIN D PO Take  by mouth Daily.       No current facility-administered medications for this visit.     Patient has no known allergies.    Past Medical History:   Diagnosis Date    Dizziness     Gall stone     Hx of cholecystectomy     Tobacco abuse      Social History     Socioeconomic History    Marital status:    Tobacco Use    Smoking status: Every Day     Packs/day: 1.50     Types: Cigarettes     Start date: 2002    Smokeless tobacco: Never    Tobacco comments:     Since 2003   Vaping Use    Vaping Use: Never used   Substance and Sexual Activity    Alcohol use: Yes     Comment: rarely, twice a year    Drug use: Never    Sexual activity: Defer     Family History   Problem Relation Age of Onset    Other Mother         medical history unknown    Other Father         unknown    No Known Problems Sister     Stroke Maternal Grandmother     Valvular heart disease Maternal Grandmother     Hypertension Maternal Grandmother     Heart disease Maternal Grandmother     Diabetes Maternal Grandmother     Arthritis Maternal Grandmother     Arthritis Maternal Grandfather     Diabetes Maternal Grandfather     Hypertension Maternal Grandfather     Stroke Maternal Grandfather     Parkinsonism Maternal Grandfather     Heart attack Maternal Grandfather         Multiple MI's, first at 45 years old    Heart disease Maternal Grandfather         s/p stenting    No Known Problems Daughter     No Known Problems Daughter      Review of Systems   Constitutional: Positive for  "malaise/fatigue. Negative for decreased appetite.   HENT: Negative.     Eyes:  Negative for blurred vision.   Cardiovascular:  Negative for chest pain, dyspnea on exertion, leg swelling, palpitations and syncope.   Respiratory:  Negative for shortness of breath and sleep disturbances due to breathing.    Endocrine: Negative.    Hematologic/Lymphatic: Negative for bleeding problem. Does not bruise/bleed easily.   Skin: Negative.    Musculoskeletal:  Negative for falls and myalgias.   Gastrointestinal:  Negative for abdominal pain, heartburn and melena.   Genitourinary:  Negative for hematuria.   Neurological:  Positive for dizziness (improved). Negative for light-headedness and seizures.   Psychiatric/Behavioral:  Negative for altered mental status.    Allergic/Immunologic: Negative.       Objective     /70 (BP Location: Right arm)   Pulse 60   Ht 182.9 cm (72.01\")   Wt 90.9 kg (200 lb 6.4 oz)   BMI 27.17 kg/m²     Vitals and nursing note reviewed.   Constitutional:       General: Not in acute distress.     Appearance: Well-developed. Not diaphoretic.   Eyes:      Pupils: Pupils are equal, round, and reactive to light.   HENT:      Head: Normocephalic.   Pulmonary:      Effort: Pulmonary effort is normal. No respiratory distress.      Breath sounds: Normal breath sounds.   Cardiovascular:      Normal rate. Regular rhythm.   Pulses:     Intact distal pulses.   Edema:     Peripheral edema absent.   Abdominal:      General: Bowel sounds are normal.      Palpations: Abdomen is soft.   Musculoskeletal: Normal range of motion.      Cervical back: Normal range of motion. Skin:     General: Skin is warm and dry.   Neurological:      Mental Status: Alert and oriented to person, place, and time.        Procedures          Problem List Items Addressed This Visit          Cardiac and Vasculature    Palpitations    Relevant Medications    metoprolol succinate XL (TOPROL-XL) 25 MG 24 hr tablet    Other Relevant Orders    " CBC & Differential    Comprehensive Metabolic Panel    Lipid Panel    TSH    Magnesium    Primary hypertension    Relevant Medications    metoprolol succinate XL (TOPROL-XL) 25 MG 24 hr tablet    Other Relevant Orders    CBC & Differential    Comprehensive Metabolic Panel    Lipid Panel    TSH    Magnesium    Hypercholesteremia    Relevant Medications    rosuvastatin (CRESTOR) 10 MG tablet    Other Relevant Orders    CBC & Differential    Comprehensive Metabolic Panel    Lipid Panel    TSH    Magnesium     Other Visit Diagnoses       Medication management    -  Primary    Relevant Orders    CBC & Differential    Comprehensive Metabolic Panel    Lipid Panel    TSH    Magnesium           HTN- well controlled. Reviewed stress test with him. Continue Toprol XL. Limit Na. Weight loss.     2.   Hyperlipidemia- LDL elevated at 136, Crestor 10 mg added. Advised to repeat labs to check lipids, LFT.     3.   Dizziness- improved, MRI per pt remained stable.       4.   Smoking cessation encouraged.     Follow up in one year or sooner if needed.     BMI is >= 25 and <30. (Overweight) The following options were offered after discussion;: nutrition counseling/recommendations     Yaya Bello  reports that he has been smoking cigarettes. He started smoking about 21 years ago. He has been smoking an average of 1.5 packs per day. He has never used smokeless tobacco.. I have educated him on the risk of diseases from using tobacco products such as cancer, COPD, and heart disease.             Electronically signed by FLOR Farr,  June 5, 2023 08:06 EDT

## 2024-06-12 DIAGNOSIS — I10 PRIMARY HYPERTENSION: ICD-10-CM

## 2024-06-12 DIAGNOSIS — R00.2 PALPITATIONS: ICD-10-CM

## 2024-06-12 DIAGNOSIS — E78.00 HYPERCHOLESTEREMIA: ICD-10-CM

## 2024-06-12 RX ORDER — ROSUVASTATIN CALCIUM 10 MG/1
10 TABLET, COATED ORAL DAILY
Qty: 90 TABLET | Refills: 0 | Status: SHIPPED | OUTPATIENT
Start: 2024-06-12

## 2024-06-12 RX ORDER — METOPROLOL SUCCINATE 25 MG/1
25 TABLET, EXTENDED RELEASE ORAL DAILY
Qty: 90 TABLET | Refills: 0 | Status: SHIPPED | OUTPATIENT
Start: 2024-06-12

## 2024-06-12 NOTE — TELEPHONE ENCOUNTER
Caller: ILIANA BURNETTE    Relationship: Emergency Contact    Best call back number: [phone]    Requested Prescriptions:   Requested Prescriptions     Pending Prescriptions Disp Refills    rosuvastatin (CRESTOR) 10 MG tablet 90 tablet 4     Sig: Take 1 tablet by mouth Daily.    metoprolol succinate XL (TOPROL-XL) 25 MG 24 hr tablet 90 tablet 4     Sig: Take 1 tablet by mouth Daily.        Pharmacy where request should be sent: [address] [phone] PH - [phone] FX     Last office visit with prescribing clinician: 5/31/2023   Last telemedicine visit with prescribing clinician: Visit date not found   Next office visit with prescribing clinician: Visit date not found     Additional details provided by patient:     Does the patient have less than a 3 day supply:  [x] Yes  [] No    Would you like a call back once the refill request has been completed: [x] Yes [] No    If the office needs to give you a call back, can they leave a voicemail: [x] Yes [] No    Kamille Burnett Rep   06/12/24 09:17 EDT

## 2024-08-01 ENCOUNTER — OFFICE VISIT (OUTPATIENT)
Dept: CARDIOLOGY | Facility: CLINIC | Age: 39
End: 2024-08-01
Payer: COMMERCIAL

## 2024-08-01 VITALS
DIASTOLIC BLOOD PRESSURE: 90 MMHG | HEART RATE: 60 BPM | WEIGHT: 204 LBS | BODY MASS INDEX: 27.63 KG/M2 | SYSTOLIC BLOOD PRESSURE: 134 MMHG | HEIGHT: 72 IN

## 2024-08-01 DIAGNOSIS — E78.00 HYPERCHOLESTEREMIA: ICD-10-CM

## 2024-08-01 DIAGNOSIS — R00.2 PALPITATIONS: ICD-10-CM

## 2024-08-01 DIAGNOSIS — I10 PRIMARY HYPERTENSION: Primary | ICD-10-CM

## 2024-08-01 DIAGNOSIS — E55.9 VITAMIN D INSUFFICIENCY: ICD-10-CM

## 2024-08-01 RX ORDER — ROSUVASTATIN CALCIUM 10 MG/1
10 TABLET, COATED ORAL DAILY
Qty: 90 TABLET | Refills: 3 | Status: SHIPPED | OUTPATIENT
Start: 2024-08-01

## 2024-08-01 RX ORDER — HYDROCHLOROTHIAZIDE 12.5 MG/1
12.5 TABLET ORAL DAILY
Qty: 90 TABLET | Refills: 3 | Status: SHIPPED | OUTPATIENT
Start: 2024-08-01

## 2024-08-01 RX ORDER — METOPROLOL SUCCINATE 25 MG/1
25 TABLET, EXTENDED RELEASE ORAL DAILY
Qty: 90 TABLET | Refills: 3 | Status: SHIPPED | OUTPATIENT
Start: 2024-08-01

## 2024-08-01 NOTE — PROGRESS NOTES
Chief Complaint   Patient presents with    Follow-up     Pt denies any CP, SOB or palpitations        HPI:  LEILANI Bello is a 38 y.o. male referred in 2022 secondary to dizziness. Prior to visit, he suffered head injury when a large tree limb fell on his head requiring 11 staples. Apparently no CT done at that time. Initially, he felt well then a few days later became diaphoretic, nauseated and dizzy. He returned to ER, CT scan showed small lytic focus in the right parietal bone felt to be calvarial hemangioma. Also noted to have bradycardia and was advised to see cardiology.      Cardiac work up showed no significant bradycardia or arrhythmia, stress and echo showed good exercise tolerance, no ischemia by EKG criteria, hypertensive blood pressure response to exercise and normal LVEF and valves. Toprol XL 25 mg daily added. Labs showed normal CBC and CMP. , Tri 51, HDL 58, . Crestor 10 mg added. Smoking cessation encouraged.      He returns today for follow up. Patient denies chest pain, pressure, palpitations, tightness, dizziness, shortness of air. BP slightly elevated today. He has not had labs in a year or so.     Cardiac History:    Past Surgical History:   Procedure Laterality Date    CARDIOVASCULAR STRESS TEST  03/30/2022    R.Stress- 10 Min. 13.4 METS. 87% THR. BP- 206/72. Negative    ECHO - CONVERTED  03/30/2022    EF 65%. Trace-Mild MR    OTHER SURGICAL HISTORY  01/11/2022    CT Head- 8 mm Lytic lesion in (R) parietal bone. R/O Calvarial Hemangioma    OTHER SURGICAL HISTORY  03/31/2022    Ext Mon 19 Days. Avg 76. . Rare PAC & PVC    SHOULDER ARTHROSCOPY         Current Outpatient Medications   Medication Sig Dispense Refill    Ascorbic Acid (VITAMIN C PO) Take  by mouth Daily.      Cyanocobalamin (VITAMIN B 12 PO) Take  by mouth Daily.      metoprolol succinate XL (TOPROL-XL) 25 MG 24 hr tablet Take 1 tablet by mouth Daily. 90 tablet 3    Multiple Vitamins-Minerals (ZINC PO) Take   "by mouth Daily.      rosuvastatin (CRESTOR) 10 MG tablet Take 1 tablet by mouth Daily. 90 tablet 3    VITAMIN D PO Take  by mouth Daily.      hydroCHLOROthiazide 12.5 MG tablet Take 1 tablet by mouth Daily. 90 tablet 3     No current facility-administered medications for this visit.       Patient has no known allergies.    Past Medical History:   Diagnosis Date    Dizziness     Gall stone     Hx of cholecystectomy     Tobacco abuse        Social History     Socioeconomic History    Marital status:    Tobacco Use    Smoking status: Every Day     Current packs/day: 1.50     Average packs/day: 1.5 packs/day for 22.6 years (33.9 ttl pk-yrs)     Types: Cigarettes     Start date: 2002    Smokeless tobacco: Never    Tobacco comments:     Since 2003   Vaping Use    Vaping status: Never Used   Substance and Sexual Activity    Alcohol use: Yes     Comment: rarely, twice a year    Drug use: Never    Sexual activity: Defer       Family History   Problem Relation Age of Onset    Other Mother         medical history unknown    Other Father         unknown    No Known Problems Sister     Stroke Maternal Grandmother     Valvular heart disease Maternal Grandmother     Hypertension Maternal Grandmother     Heart disease Maternal Grandmother     Diabetes Maternal Grandmother     Arthritis Maternal Grandmother     Arthritis Maternal Grandfather     Diabetes Maternal Grandfather     Hypertension Maternal Grandfather     Stroke Maternal Grandfather     Parkinsonism Maternal Grandfather     Heart attack Maternal Grandfather         Multiple MI's, first at 45 years old    Heart disease Maternal Grandfather         s/p stenting    No Known Problems Daughter     No Known Problems Daughter        Vitals:   /90 (BP Location: Left arm, Patient Position: Sitting, Cuff Size: Adult)   Pulse 60   Ht 182.9 cm (72\")   Wt 92.5 kg (204 lb)   BMI 27.67 kg/m²     Physical Exam  Vitals and nursing note reviewed.   Neck:      Vascular: No " carotid bruit.   Cardiovascular:      Rate and Rhythm: Normal rate and regular rhythm.      Pulses: Normal pulses.      Heart sounds: Normal heart sounds. No murmur heard.     No friction rub. No gallop.   Pulmonary:      Effort: Pulmonary effort is normal.      Breath sounds: Normal breath sounds and air entry.   Musculoskeletal:      Right lower leg: No edema.      Left lower leg: No edema.   Skin:     General: Skin is warm and dry.      Capillary Refill: Capillary refill takes less than 2 seconds.   Neurological:      Mental Status: He is alert and oriented to person, place, and time.       Procedures     Assessment & Plan     Diagnoses and all orders for this visit:    1. Primary hypertension (Primary)  -     metoprolol succinate XL (TOPROL-XL) 25 MG 24 hr tablet; Take 1 tablet by mouth Daily.  Dispense: 90 tablet; Refill: 3  -     CBC & Differential; Future  -     Comprehensive Metabolic Panel; Future  -     Magnesium; Future  -     TSH; Future    2. Palpitations  -     metoprolol succinate XL (TOPROL-XL) 25 MG 24 hr tablet; Take 1 tablet by mouth Daily.  Dispense: 90 tablet; Refill: 3  -     CBC & Differential; Future  -     Comprehensive Metabolic Panel; Future  -     Magnesium; Future  -     TSH; Future    3. Hypercholesteremia  -     rosuvastatin (CRESTOR) 10 MG tablet; Take 1 tablet by mouth Daily.  Dispense: 90 tablet; Refill: 3  -     Lipid Panel; Future    4. Vitamin D insufficiency  -     Vitamin D,25-Hydroxy; Future    Other orders  -     hydroCHLOROthiazide 12.5 MG tablet; Take 1 tablet by mouth Daily.  Dispense: 90 tablet; Refill: 3    Hypertension  - BP a little elevated today  - Continue Toprol XL 25 mg and add HCTZ 12.5 mg daily    Palpitations  - Controlled with metoprolol, continue    Hypercholesterolemia  - No recent labs so lipid panel ordered  - Continue rosuvastatin    Stable from cardiac standpoint.  Added hydrochlorothiazide for better blood pressure control.  Ordered labs as he has not  had any labs in a while and I would like to check liver enzymes and lipid levels along with doing an overall checkup.  Will forward to PCP    Visit Diagnoses:    ICD-10-CM ICD-9-CM   1. Primary hypertension  I10 401.9   2. Palpitations  R00.2 785.1   3. Hypercholesteremia  E78.00 272.0   4. Vitamin D insufficiency  E55.9 268.9       Meds Ordered During Visit:  New Medications Ordered This Visit   Medications    hydroCHLOROthiazide 12.5 MG tablet     Sig: Take 1 tablet by mouth Daily.     Dispense:  90 tablet     Refill:  3    metoprolol succinate XL (TOPROL-XL) 25 MG 24 hr tablet     Sig: Take 1 tablet by mouth Daily.     Dispense:  90 tablet     Refill:  3    rosuvastatin (CRESTOR) 10 MG tablet     Sig: Take 1 tablet by mouth Daily.     Dispense:  90 tablet     Refill:  3       Follow Up Appointment:   Return in about 6 months (around 2/1/2025), or if symptoms worsen or fail to improve.           This document has been electronically signed by FLOR Deluca  August 1, 2024 17:09 EDT    Dictated Utilizing Dragon Dictation: Part of this note may be an electronic transcription/translation of spoken language to printed text using the Dragon Dictation System.

## 2024-08-05 ENCOUNTER — TELEPHONE (OUTPATIENT)
Dept: CARDIOLOGY | Facility: CLINIC | Age: 39
End: 2024-08-05
Payer: COMMERCIAL

## 2024-08-05 NOTE — TELEPHONE ENCOUNTER
Caller: ILIANA BURNETTE    Relationship: Emergency Contact    Best call back number: [phone]    Which medication are you concerned about: HYDROCHLOROTHIAZIDE 12.5 MG    Who prescribed you this medication: FLOR TANNER    When did you start taking this medication: 8.3.24    What are your concerns: PATIENTS WIFE CALLED WITH CONCERNS THAT THE HCTZ MEDICATION IS MAKING HIM DIZZY. PLEASE CALL HER BACK TO DISCUSS IF THIS IS NORMAL OR NOT. THANK YOU    How long have you had these concerns: SINCE STARTING MEDICATION

## 2024-08-07 NOTE — TELEPHONE ENCOUNTER
Spoke with wife Genevieve concerning medication. She reports that he started the HCTZ, had some dizziness the evening he started medication and then the next day. She reports he has not been drinking adequate water, he has been drinking a lot of Mt Dew. Advised that he needs to have adequate hydration with medication. Wife to encourage adequate hydration, have him decrease Mt Dew intake, and will have him obtain labs tomorrow.

## 2024-08-08 ENCOUNTER — LAB (OUTPATIENT)
Dept: LAB | Facility: HOSPITAL | Age: 39
End: 2024-08-08
Payer: COMMERCIAL

## 2024-08-08 DIAGNOSIS — E78.00 HYPERCHOLESTEREMIA: ICD-10-CM

## 2024-08-08 DIAGNOSIS — E55.9 VITAMIN D INSUFFICIENCY: ICD-10-CM

## 2024-08-08 DIAGNOSIS — I10 PRIMARY HYPERTENSION: ICD-10-CM

## 2024-08-08 DIAGNOSIS — R00.2 PALPITATIONS: ICD-10-CM

## 2024-08-08 LAB
ALBUMIN SERPL-MCNC: 4.7 G/DL (ref 3.5–5.2)
ALBUMIN/GLOB SERPL: 1.7 G/DL
ALP SERPL-CCNC: 92 U/L (ref 39–117)
ALT SERPL W P-5'-P-CCNC: 22 U/L (ref 1–41)
ANION GAP SERPL CALCULATED.3IONS-SCNC: 13.6 MMOL/L (ref 5–15)
AST SERPL-CCNC: 15 U/L (ref 1–40)
BASOPHILS # BLD AUTO: 0.04 10*3/MM3 (ref 0–0.2)
BASOPHILS NFR BLD AUTO: 0.3 % (ref 0–1.5)
BILIRUB SERPL-MCNC: 0.4 MG/DL (ref 0–1.2)
BUN SERPL-MCNC: 11 MG/DL (ref 6–20)
BUN/CREAT SERPL: 10.5 (ref 7–25)
CALCIUM SPEC-SCNC: 10.2 MG/DL (ref 8.6–10.5)
CHLORIDE SERPL-SCNC: 100 MMOL/L (ref 98–107)
CHOLEST SERPL-MCNC: 144 MG/DL (ref 0–200)
CO2 SERPL-SCNC: 26.4 MMOL/L (ref 22–29)
CREAT SERPL-MCNC: 1.05 MG/DL (ref 0.76–1.27)
DEPRECATED RDW RBC AUTO: 43.9 FL (ref 37–54)
EGFRCR SERPLBLD CKD-EPI 2021: 92.6 ML/MIN/1.73
EOSINOPHIL # BLD AUTO: 0.12 10*3/MM3 (ref 0–0.4)
EOSINOPHIL NFR BLD AUTO: 0.9 % (ref 0.3–6.2)
ERYTHROCYTE [DISTWIDTH] IN BLOOD BY AUTOMATED COUNT: 12.8 % (ref 12.3–15.4)
GLOBULIN UR ELPH-MCNC: 2.8 GM/DL
GLUCOSE SERPL-MCNC: 110 MG/DL (ref 65–99)
HCT VFR BLD AUTO: 54.8 % (ref 37.5–51)
HDLC SERPL-MCNC: 50 MG/DL (ref 40–60)
HGB BLD-MCNC: 17.7 G/DL (ref 13–17.7)
IMM GRANULOCYTES # BLD AUTO: 0.06 10*3/MM3 (ref 0–0.05)
IMM GRANULOCYTES NFR BLD AUTO: 0.4 % (ref 0–0.5)
LDLC SERPL CALC-MCNC: 84 MG/DL (ref 0–100)
LDLC/HDLC SERPL: 1.71 {RATIO}
LYMPHOCYTES # BLD AUTO: 1.92 10*3/MM3 (ref 0.7–3.1)
LYMPHOCYTES NFR BLD AUTO: 14.3 % (ref 19.6–45.3)
MAGNESIUM SERPL-MCNC: 2.3 MG/DL (ref 1.6–2.6)
MCH RBC QN AUTO: 30.2 PG (ref 26.6–33)
MCHC RBC AUTO-ENTMCNC: 32.3 G/DL (ref 31.5–35.7)
MCV RBC AUTO: 93.5 FL (ref 79–97)
MONOCYTES # BLD AUTO: 0.61 10*3/MM3 (ref 0.1–0.9)
MONOCYTES NFR BLD AUTO: 4.5 % (ref 5–12)
NEUTROPHILS NFR BLD AUTO: 10.7 10*3/MM3 (ref 1.7–7)
NEUTROPHILS NFR BLD AUTO: 79.6 % (ref 42.7–76)
NRBC BLD AUTO-RTO: 0 /100 WBC (ref 0–0.2)
PLATELET # BLD AUTO: 246 10*3/MM3 (ref 140–450)
PMV BLD AUTO: 10.6 FL (ref 6–12)
POTASSIUM SERPL-SCNC: 4.6 MMOL/L (ref 3.5–5.2)
PROT SERPL-MCNC: 7.5 G/DL (ref 6–8.5)
RBC # BLD AUTO: 5.86 10*6/MM3 (ref 4.14–5.8)
SODIUM SERPL-SCNC: 140 MMOL/L (ref 136–145)
TRIGL SERPL-MCNC: 43 MG/DL (ref 0–150)
TSH SERPL DL<=0.05 MIU/L-ACNC: 1 UIU/ML (ref 0.27–4.2)
VLDLC SERPL-MCNC: 10 MG/DL (ref 5–40)
WBC NRBC COR # BLD AUTO: 13.45 10*3/MM3 (ref 3.4–10.8)

## 2024-08-08 PROCEDURE — 80061 LIPID PANEL: CPT

## 2024-08-08 PROCEDURE — 36415 COLL VENOUS BLD VENIPUNCTURE: CPT

## 2024-08-08 PROCEDURE — 80053 COMPREHEN METABOLIC PANEL: CPT

## 2024-08-08 PROCEDURE — 84443 ASSAY THYROID STIM HORMONE: CPT

## 2024-08-08 PROCEDURE — 83735 ASSAY OF MAGNESIUM: CPT

## 2024-08-08 PROCEDURE — 82306 VITAMIN D 25 HYDROXY: CPT

## 2024-08-08 PROCEDURE — 85025 COMPLETE CBC W/AUTO DIFF WBC: CPT

## 2024-08-09 LAB — 25(OH)D3 SERPL-MCNC: 57.7 NG/ML (ref 30–100)

## 2025-02-04 ENCOUNTER — TELEPHONE (OUTPATIENT)
Dept: CARDIOLOGY | Facility: CLINIC | Age: 40
End: 2025-02-04
Payer: COMMERCIAL

## 2025-03-18 ENCOUNTER — OFFICE VISIT (OUTPATIENT)
Dept: CARDIOLOGY | Facility: CLINIC | Age: 40
End: 2025-03-18
Payer: COMMERCIAL

## 2025-03-18 VITALS
HEIGHT: 72 IN | OXYGEN SATURATION: 96 % | WEIGHT: 194.2 LBS | HEART RATE: 71 BPM | SYSTOLIC BLOOD PRESSURE: 130 MMHG | BODY MASS INDEX: 26.3 KG/M2 | DIASTOLIC BLOOD PRESSURE: 80 MMHG

## 2025-03-18 DIAGNOSIS — I10 PRIMARY HYPERTENSION: ICD-10-CM

## 2025-03-18 DIAGNOSIS — R00.2 PALPITATIONS: ICD-10-CM

## 2025-03-18 DIAGNOSIS — E78.00 HYPERCHOLESTEREMIA: ICD-10-CM

## 2025-03-18 PROCEDURE — 99214 OFFICE O/P EST MOD 30 MIN: CPT | Performed by: NURSE PRACTITIONER

## 2025-03-18 RX ORDER — METOPROLOL SUCCINATE 25 MG/1
25 TABLET, EXTENDED RELEASE ORAL DAILY
Qty: 90 TABLET | Refills: 3 | Status: SHIPPED | OUTPATIENT
Start: 2025-03-18

## 2025-03-18 RX ORDER — HYDROCHLOROTHIAZIDE 12.5 MG/1
12.5 TABLET ORAL DAILY
Qty: 90 TABLET | Refills: 3 | Status: SHIPPED | OUTPATIENT
Start: 2025-03-18

## 2025-03-18 RX ORDER — ROSUVASTATIN CALCIUM 10 MG/1
10 TABLET, COATED ORAL DAILY
Qty: 90 TABLET | Refills: 3 | Status: SHIPPED | OUTPATIENT
Start: 2025-03-18

## 2025-03-18 NOTE — PROGRESS NOTES
Chief Complaint   Patient presents with    Follow-up     Cardiac management - 8 month follow up     labs     Last set 8/2024    Hypertension     He states he does not check at home but does not feel any issues     HCTZ - can he take at night VS in the AM - he feels it may run higher in the AM when getting up VS during the day.     No chest pain / tightness or palpations     medication     Went over verbally     No daily aspirin     Refills pended for 90 day supply     Patient did not bring med list or medicine bottles to appointment, med list has been reviewed and updated based on patient's knowledge of their meds.          HPI:  LEILANI Bello is a 39 y.o. male referred in 2022 secondary to dizziness. Prior to visit, he suffered head injury when a large tree limb fell on his head requiring 11 staples. Apparently no CT done at that time. Initially, he felt well then a few days later became diaphoretic, nauseated and dizzy. He returned to ER, CT scan showed small lytic focus in the right parietal bone felt to be calvarial hemangioma. Also noted to have bradycardia and was advised to see cardiology.      Cardiac work up showed no significant bradycardia or arrhythmia, stress and echo showed good exercise tolerance, no ischemia by EKG criteria, hypertensive blood pressure response to exercise and normal LVEF and valves.      He returns today for follow up.  Patient denies chest pain, pressure, palpitations, tightness, dizziness, shortness of air. Labs 8/2024. Needs refills.     Cardiac History:    Past Surgical History:   Procedure Laterality Date    CARDIOVASCULAR STRESS TEST  03/30/2022    R.Stress- 10 Min. 13.4 METS. 87% THR. BP- 206/72. Negative    ECHO - CONVERTED  03/30/2022    EF 65%. Trace-Mild MR    OTHER SURGICAL HISTORY  01/11/2022    CT Head- 8 mm Lytic lesion in (R) parietal bone. R/O Calvarial Hemangioma    OTHER SURGICAL HISTORY  03/31/2022    Ext Mon 19 Days. Avg 76. . Rare PAC & PVC     SHOULDER ARTHROSCOPY         Current Outpatient Medications   Medication Sig Dispense Refill    Ascorbic Acid (VITAMIN C PO) Take  by mouth Daily.      Cyanocobalamin (VITAMIN B 12 PO) Take  by mouth Daily.      hydroCHLOROthiazide 12.5 MG tablet Take 1 tablet by mouth Daily. 90 tablet 3    metoprolol succinate XL (TOPROL-XL) 25 MG 24 hr tablet Take 1 tablet by mouth Daily. 90 tablet 3    Multiple Vitamins-Minerals (ZINC PO) Take  by mouth Daily.      rosuvastatin (CRESTOR) 10 MG tablet Take 1 tablet by mouth Daily. 90 tablet 3    VITAMIN D PO Take  by mouth Daily.       No current facility-administered medications for this visit.       Patient has no known allergies.    Past Medical History:   Diagnosis Date    Dizziness     Gall stone     Hx of cholecystectomy     Tobacco abuse        Social History     Socioeconomic History    Marital status:    Tobacco Use    Smoking status: Every Day     Current packs/day: 1.50     Average packs/day: 1.5 packs/day for 23.2 years (34.8 ttl pk-yrs)     Types: Cigarettes     Start date: 2002    Smokeless tobacco: Never    Tobacco comments:     Since 2003   Vaping Use    Vaping status: Never Used   Substance and Sexual Activity    Alcohol use: Yes     Comment: rarely, twice a year    Drug use: Never    Sexual activity: Defer       Family History   Problem Relation Age of Onset    Other Mother         medical history unknown    Other Father         unknown    No Known Problems Sister     Stroke Maternal Grandmother     Valvular heart disease Maternal Grandmother     Hypertension Maternal Grandmother     Heart disease Maternal Grandmother     Diabetes Maternal Grandmother     Arthritis Maternal Grandmother     Arthritis Maternal Grandfather     Diabetes Maternal Grandfather     Hypertension Maternal Grandfather     Stroke Maternal Grandfather     Parkinsonism Maternal Grandfather     Heart attack Maternal Grandfather         Multiple MI's, first at 45 years old    Heart  "disease Maternal Grandfather         s/p stenting    No Known Problems Daughter     No Known Problems Daughter        Vitals:   /80 (BP Location: Left arm, Patient Position: Sitting, Cuff Size: Adult)   Pulse 71   Ht 182.9 cm (72\")   Wt 88.1 kg (194 lb 3.2 oz)   SpO2 96%   BMI 26.34 kg/m²     Physical Exam  Vitals and nursing note reviewed.   Neck:      Vascular: No carotid bruit.   Cardiovascular:      Rate and Rhythm: Normal rate and regular rhythm.      Pulses: Normal pulses.      Heart sounds: Normal heart sounds. No murmur heard.     No friction rub. No gallop.   Pulmonary:      Effort: Pulmonary effort is normal.      Breath sounds: Normal breath sounds and air entry.   Musculoskeletal:      Right lower leg: No edema.      Left lower leg: No edema.   Skin:     General: Skin is warm and dry.      Capillary Refill: Capillary refill takes less than 2 seconds.   Neurological:      Mental Status: He is alert and oriented to person, place, and time.       Procedures     Assessment & Plan     Diagnoses and all orders for this visit:    1. Hypercholesteremia  -     rosuvastatin (CRESTOR) 10 MG tablet; Take 1 tablet by mouth Daily.  Dispense: 90 tablet; Refill: 3    2. Palpitations  -     metoprolol succinate XL (TOPROL-XL) 25 MG 24 hr tablet; Take 1 tablet by mouth Daily.  Dispense: 90 tablet; Refill: 3    3. Primary hypertension  -     metoprolol succinate XL (TOPROL-XL) 25 MG 24 hr tablet; Take 1 tablet by mouth Daily.  Dispense: 90 tablet; Refill: 3    Other orders  -     hydroCHLOROthiazide 12.5 MG tablet; Take 1 tablet by mouth Daily.  Dispense: 90 tablet; Refill: 3    Hypercholesteremia  - Labs with PCP  - Continue Rosuvastatin    HTN  - BP controlled  - Continue HCTZ, Toprol    Palpitations  - Controlled with Toprol, continue    Stable from a cardiac standpoint. No further testing recommended at this time. No medication changes made today. Refills sent to pharmacy.        Visit Diagnoses:    " ICD-10-CM ICD-9-CM   1. Hypercholesteremia  E78.00 272.0   2. Palpitations  R00.2 785.1   3. Primary hypertension  I10 401.9       Meds Ordered During Visit:  New Medications Ordered This Visit   Medications    rosuvastatin (CRESTOR) 10 MG tablet     Sig: Take 1 tablet by mouth Daily.     Dispense:  90 tablet     Refill:  3    metoprolol succinate XL (TOPROL-XL) 25 MG 24 hr tablet     Sig: Take 1 tablet by mouth Daily.     Dispense:  90 tablet     Refill:  3    hydroCHLOROthiazide 12.5 MG tablet     Sig: Take 1 tablet by mouth Daily.     Dispense:  90 tablet     Refill:  3       Follow Up Appointment:   Return in about 1 year (around 3/18/2026), or if symptoms worsen or fail to improve.           This document has been electronically signed by FLOR Deluca  March 18, 2025 17:29 EDT    Dictated Utilizing Dragon Dictation: Part of this note may be an electronic transcription/translation of spoken language to printed text using the Dragon Dictation System.